# Patient Record
Sex: FEMALE | Race: OTHER | URBAN - METROPOLITAN AREA
[De-identification: names, ages, dates, MRNs, and addresses within clinical notes are randomized per-mention and may not be internally consistent; named-entity substitution may affect disease eponyms.]

---

## 2018-01-08 NOTE — PATIENT DISCUSSION
PIGMENT DISPERSION SYNDROME, OU_: INTRAOCULAR PRESSURE WITHIN ACCEPTABLE LIMITS. RETURN FOR FOLLOW-UP AS SCHEDULED.

## 2018-01-08 NOTE — PATIENT DISCUSSION
Pigment Dispersion Syndrome Counseling: The nature of the diagnosis and pathophysiology was discussed. The patient understands and will follow up as scheduled or sooner if symptomatic.

## 2021-02-02 NOTE — PATIENT DISCUSSION
PIGMENT DISPERSION SYNDROME, Ou__: INTRAOCULAR PRESSURE WITHIN ACCEPTABLE LIMITS. CONTINUE _no drops_RETURN FOR FOLLOW-UP AS SCHEDULED.

## 2021-02-11 NOTE — PATIENT DISCUSSION
Final Anesthesia Post-op Assessment    Patient: Lis Corrigan  Procedure(s) Performed: LEFT EXTRACTION, CATARACT, WITH IOL INSERTION - LEFT  Anesthesia type: MAC    Vitals Value Taken Time   Temp 97.9f 02/11/21 1356   Pulse 78 02/11/21 1356   Resp 18 02/11/21 1356   SpO2 98 02/11/21 1356   /98 02/11/21 1356         Patient Location: Phase II  Post-op Vital Signs:stable  Level of Consciousness: participates in exam, alert, awake and oriented  Respiratory Status: spontaneous ventilation  Cardiovascular blood pressure returned to baseline  Hydration: euvolemic  Pain Management: well controlled  Handoff: Handoff to receiving clinician was performed and questions were answered  Vomiting: none   Nausea: None  Airway Patency:patent  Post-op Assessment: awake, alert, appropriately conversant, or baseline, no complications, patient tolerated procedure well with no complications, no evidence of recall, dentition within defined limits, moving all extremities and No Corneal Abrasion  Comments: Pt ready for discharge.      No complications documented.    MILD DRY EYES: PRESCRIBED ARTIFICIAL TEARS BID - QID, OU RETURN FOR FOLLOW-UP AS SCHEDULED OR SOONER IF SYMPTOMS WORSEN.

## 2022-06-30 RX ORDER — AZELASTINE HYDROCHLORIDE, FLUTICASONE PROPIONATE 137; 50 UG/1; UG/1
SPRAY, METERED NASAL
COMMUNITY

## 2022-06-30 RX ORDER — HYOSCYAMINE SULFATE 0.12 MG/1
TABLET SUBLINGUAL
COMMUNITY

## 2022-06-30 RX ORDER — PROGESTERONE 100 MG/1
1 CAPSULE ORAL
COMMUNITY

## 2022-06-30 RX ORDER — LEVOTHYROXINE AND LIOTHYRONINE 57; 13.5 UG/1; UG/1
TABLET ORAL
COMMUNITY

## 2022-11-02 ENCOUNTER — NEW PATIENT (OUTPATIENT)
Dept: URBAN - METROPOLITAN AREA CLINIC 14 | Facility: CLINIC | Age: 72
End: 2022-11-02

## 2022-11-02 DIAGNOSIS — D23.112: ICD-10-CM

## 2022-11-02 DIAGNOSIS — D23.122: ICD-10-CM

## 2022-11-02 PROCEDURE — 67840 REMOVE EYELID LESION: CPT

## 2022-11-02 PROCEDURE — 92285 EXTERNAL OCULAR PHOTOGRAPHY: CPT

## 2022-11-02 PROCEDURE — 92002 INTRM OPH EXAM NEW PATIENT: CPT

## 2022-11-09 ASSESSMENT — VISUAL ACUITY
OD_SC: 20/25
OS_SC: 20/25